# Patient Record
Sex: MALE | Race: WHITE | URBAN - METROPOLITAN AREA
[De-identification: names, ages, dates, MRNs, and addresses within clinical notes are randomized per-mention and may not be internally consistent; named-entity substitution may affect disease eponyms.]

---

## 2017-10-15 ENCOUNTER — HOSPITAL ENCOUNTER (EMERGENCY)
Facility: CLINIC | Age: 27
Discharge: HOME | End: 2017-10-16
Attending: EMERGENCY MEDICINE | Admitting: EMERGENCY MEDICINE
Payer: COMMERCIAL

## 2017-10-15 DIAGNOSIS — B34.9 VIRAL SYNDROME: ICD-10-CM

## 2017-10-15 DIAGNOSIS — J06.9 VIRAL UPPER RESPIRATORY TRACT INFECTION: ICD-10-CM

## 2017-10-15 PROCEDURE — 99283 EMERGENCY DEPT VISIT LOW MDM: CPT

## 2017-10-16 VITALS
RESPIRATION RATE: 18 BRPM | SYSTOLIC BLOOD PRESSURE: 159 MMHG | TEMPERATURE: 98.1 F | HEART RATE: 96 BPM | DIASTOLIC BLOOD PRESSURE: 98 MMHG | OXYGEN SATURATION: 96 %

## 2017-10-16 RX ORDER — SODIUM CHLORIDE 9 MG/ML
1000 INJECTION, SOLUTION INTRAVENOUS CONTINUOUS
Status: DISCONTINUED | OUTPATIENT
Start: 2017-10-16 | End: 2017-10-16

## 2017-10-16 ASSESSMENT — ENCOUNTER SYMPTOMS
RHINORRHEA: 1
CHILLS: 1
VOMITING: 1
DIAPHORESIS: 1
FEVER: 1
COUGH: 1
CHEST TIGHTNESS: 1

## 2017-10-16 NOTE — ED PROVIDER NOTES
History     Chief Complaint:  Cough    The history is provided by the patient.      Mariusz Griffiths is a 27 year old male who presents to the emergency department today for evaluation of cough. Yesterday, the patient experienced onset of cough which got progressively worse today. At home, he measured a fever of 100.0 F and was also experiencing chills, chest tightness from coughing, and nasal congestion with some green mucous. Endorses post-tussive emesis. He states that laying down makes the cough worse. He took ibuprofen approximately 1.5 hours prior to arrival. Upon arrival to the emergency department, he started having sweating diaphoresis. No sick contacts.    Allergies:  Drug allergies reviewed. No pertinent drug allergies.     Medications:    Medications reviewed. No pertinent medications.    Past Medical History:    History reviewed. No pertinent past medical history.    Past Surgical History:    Past surgical history reviewed. No pertinent past surgical history.     Family History:    Family history reviewed. No pertinent family history.     Social History:  Smoking Status: Never Smoker  Smokeless Tobacco: Never Used  Alcohol Use: Negative   Marital Status:      Review of Systems   Constitutional: Positive for chills, diaphoresis and fever.   HENT: Positive for congestion and rhinorrhea.    Respiratory: Positive for cough and chest tightness.    Gastrointestinal: Positive for vomiting (resolved).   All other systems reviewed and are negative.    Physical Exam     Patient Vitals for the past 24 hrs:   BP Temp Temp src Pulse Heart Rate Resp SpO2   10/16/17 0102 - - - 96 - - 96 %   10/16/17 0100 - 98.1  F (36.7  C) Oral 99 - - 96 %   10/16/17 0023 - - - 115 - - -   10/15/17 2357 (!) 159/98 99.3  F (37.4  C) Oral 123 123 18 98 %       Physical Exam  General: Well-developed and well-nourished; well appearing young  man; cooperative  Head:  Atraumatic  Eyes:  Extraocular movements intact; conjunctivae,  "lids, and sclerae are normal  ENT:    Normal nose; moist mucous membranes  Neck:  Supple; normal range of motion  CV:  Tachycardic rate and normal rhythm; normal heart sounds with no murmurs, rubs, or gallops detected  Resp:  No respiratory distress; clear to auscultation bilaterally without decreased breath sounds, wheezing, rales, or rhonchi  GI:  Soft; non-distended; non-tender    MS:  Normal ROM; no bilateral lower extremity edema  Skin:  Warm; diaphoretic; no pallor  Neuro: Awake; A&Ox3; normal strength  Psych:  Normal mood and affect; normal speech  Vitals reviewed.    Emergency Department Course     Emergency Department Course:    2338 Nursing notes and vitals reviewed.    2356 I performed an exam of the patient as documented above.     0025 I rechecked the patient and he states that he prefers not to undergo labs and imaging.    I discussed the treatment plan with the patient. He expressed understanding of this plan and consented to discharge. He will be discharged home with instructions for care and follow up. In addition, the patient will return to the emergency department if his symptoms persist, worsen, if new symptoms arise or if there is any concern.  All questions were answered prior to discharge.    Impression & Plan      Medical Decision Making:  Mariusz Griffiths is a 27 year old male who presents to the emergency department today for evaluation of green nasal discharge/congestion and nonproductive cough. He states he was febrile and took ibuprofen prior to arrival. On exam, patient is tachycardic and diaphoretic. His lungs are clear. He is very well- appearing otherwise.     I ordered IV fluids and basic labs as well as chest x-ray for this patient. However, he states that he is feeling much better after the fever broke and he would like to defer these tests at this time. Given I believe this is likely a viral syndrome/viral URI and the diaphoresis (now resolved) was due to his fever \"breaking,\" I " believe deferring work-up is appropriate at this time. However, I would like to monitor the patient until he has resolution of his tachycardia with PO fluid intake.    Patient took a significant amount of PO fluids while in the department. He tolerated well. He continued to appear very well. He had resolution of his tachycardia. He had a resolution of his diaphoresis. He would like to be discharged and follow-up with his primary care provider. I believe this is a reasonable course of action because, again, I believe this is likely a viral syndrome that will improve with supportive care. I provided return precautions and answered all of the patient's questions. He verbalized understanding and is amenable to discharge. Discussed hand hygiene, covering cough, etc. Will continue APAP or Motrin PRN fever.    Diagnosis:    ICD-10-CM    1. Viral upper respiratory tract infection J06.9     B97.89    2. Viral syndrome B34.9      Disposition:   The patient was discharged home.    Scribe Disclosure:  I, Love Campos, am serving as a scribe at 11:56 PM on 10/15/2017 to document services personally performed by Manuela Coyne MD, based on my observations and the provider's statements to me.      [facility] EMERGENCY DEPARTMENT       Manuela Coyne MD  10/16/17 0225

## 2017-10-16 NOTE — ED NOTES
Pt provided with discharge paperwork and educated on recommended follow-up with PCP. Pt educated on how to manage symptoms at home. Pt voiced understanding and denied any questions at discharge.

## 2017-10-16 NOTE — ED NOTES
"Patient diaphoretic with heart rate in 110-120s. Patient reports taking ibuprofen prior to arriving in emergency room. Discussed plan of care with patient. Patient reports fear of needles and that he is disinclined to get labs drawn. Patient educated on reason for obtaining labs and placing an IV for hydration. Patient encouraged to drink water for oral hydration. Discussed x-ray. Patient reports nervousness about receiving so many tests because he just changed jobs and is unsure of what insurance coverage he has. After conversation with patient, patient is no longer diaphoretic. Patient reports he feels \"so much better\" and believes his symptoms have resolved because his fever broke. MD notified. Patient to push PO fluids and to continue to monitor heart rate. MD in to talk with patient about plan of care.  "

## 2017-10-16 NOTE — DISCHARGE INSTRUCTIONS
Take Motrin or Tylenol as needed for fever.  Return with new or worsening symptoms.  Practice good hand hygiene and cover your mouth when you cough.  Follow up with primary care provider.

## 2017-10-17 ENCOUNTER — OFFICE VISIT (OUTPATIENT)
Dept: FAMILY MEDICINE | Facility: CLINIC | Age: 27
End: 2017-10-17
Payer: COMMERCIAL

## 2017-10-17 VITALS
OXYGEN SATURATION: 95 % | TEMPERATURE: 98.7 F | SYSTOLIC BLOOD PRESSURE: 136 MMHG | DIASTOLIC BLOOD PRESSURE: 90 MMHG | HEART RATE: 118 BPM | WEIGHT: 222.7 LBS | RESPIRATION RATE: 20 BRPM

## 2017-10-17 DIAGNOSIS — J00 ACUTE NASOPHARYNGITIS: Primary | ICD-10-CM

## 2017-10-17 DIAGNOSIS — R09.1 PLEURITIS: ICD-10-CM

## 2017-10-17 PROCEDURE — 99203 OFFICE O/P NEW LOW 30 MIN: CPT | Performed by: FAMILY MEDICINE

## 2017-10-17 RX ORDER — CODEINE PHOSPHATE AND GUAIFENESIN 10; 100 MG/5ML; MG/5ML
2 SOLUTION ORAL EVERY 4 HOURS PRN
Qty: 120 ML | Refills: 1 | Status: SHIPPED | OUTPATIENT
Start: 2017-10-17

## 2017-10-17 RX ORDER — IBUPROFEN 600 MG/1
600 TABLET, FILM COATED ORAL EVERY 6 HOURS PRN
Qty: 30 TABLET | Refills: 1 | Status: SHIPPED | OUTPATIENT
Start: 2017-10-17

## 2017-10-17 ASSESSMENT — ENCOUNTER SYMPTOMS
DIARRHEA: 0
CHILLS: 1
SPUTUM PRODUCTION: 1
CONSTIPATION: 0
NAUSEA: 0
COUGH: 1
VOMITING: 0
CARDIOVASCULAR NEGATIVE: 1
ABDOMINAL PAIN: 0
FEVER: 0
SORE THROAT: 1
HEADACHES: 1

## 2017-10-17 NOTE — MR AVS SNAPSHOT
"              After Visit Summary   10/17/2017    Mariusz Griffiths    MRN: [id number]           Patient Information     Date Of Birth          1990        Visit Information        Provider Department      10/17/2017 10:40 AM Andrzej Rojas MD [facility]        Today's Diagnoses     Acute nasopharyngitis    -  1    Pleuritis           Follow-ups after your visit        Follow-up notes from your care team     Return in about 1 week (around 10/24/2017).      Who to contact     If you have questions or need follow up information about today's clinic visit or your schedule please contact [facility] directly at [phone].  Normal or non-critical lab and imaging results will be communicated to you by MyChart, letter or phone within 4 business days after the clinic has received the results. If you do not hear from us within 7 days, please contact the clinic through MyChart or phone. If you have a critical or abnormal lab result, we will notify you by phone as soon as possible.  Submit refill requests through [employer] or call your pharmacy and they will forward the refill request to us. Please allow 3 business days for your refill to be completed.          Additional Information About Your Visit        MyChart Information     [employer] lets you send messages to your doctor, view your test results, renew your prescriptions, schedule appointments and more. To sign up, go to [email or web address] . Click on \"Log in\" on the left side of the screen, which will take you to the Welcome page. Then click on \"Sign up Now\" on the right side of the page.     You will be asked to enter the access code listed below, as well as some personal information. Please follow the directions to create your username and password.     Your access code is: [email or web address]  Expires: 2018  1:26 AM     Your access code will  in 90 days. If you need help or a new code, please call your [facility] " or [phone].        Care EveryWhere ID     This is your Care EveryWhere ID. This could be used by other organizations to access your [place] medical records  [id number]        Your Vitals Were     Pulse Temperature Respirations Pulse Oximetry          118 98.7  F (37.1  C) (Oral) 20 95%         Blood Pressure from Last 3 Encounters:   10/17/17 136/90   10/15/17 (!) 159/98    Weight from Last 3 Encounters:   10/17/17 222 lb 11.2 oz (101 kg)              Today, you had the following     No orders found for display         Today's Medication Changes          These changes are accurate as of: 10/17/17 11:16 AM.  If you have any questions, ask your nurse or doctor.               Start taking these medicines.        Dose/Directions    guaiFENesin-codeine 100-10 MG/5ML Soln solution   Commonly known as:  ROBITUSSIN AC   Used for:  Acute nasopharyngitis   Started by:  Andrzej Rojas MD        Dose:  2 tsp.   Take 10 mLs by mouth every 4 hours as needed for cough   Quantity:  120 mL   Refills:  1       ibuprofen 600 MG tablet   Commonly known as:  ADVIL/MOTRIN   Used for:  Pleuritis   Started by:  Andrzej Rojas MD        Dose:  600 mg   Take 1 tablet (600 mg) by mouth every 6 hours as needed for moderate pain   Quantity:  30 tablet   Refills:  1            Where to get your medicines      These medications were sent to [place], [place] - [address] [place], [place] [address]     Phone:  [phone]     ibuprofen 600 MG tablet         Some of these will need a paper prescription and others can be bought over the counter.  Ask your nurse if you have questions.     Bring a paper prescription for each of these medications     guaiFENesin-codeine 100-10 MG/5ML Soln solution                Primary Care Provider Office Phone # Fax #    Andrzej Rojas [phone]       [id number]  [facility] [address]        Equal Access to  Services     [facility]: Hadii aad ku hadcjshilpa Laurynjose david, waaxda luqadaha, qaybta kaalmada chaim, mario blankenship. So [facility] [phone].    ATENCIÓN: Si habla español, tiene a mcqueen disposición servicios gratuitos de asistencia lingüística. Llame al [phone].    We comply with applicable federal civil rights laws and [place] laws. We do not discriminate on the basis of race, color, national origin, age, disability, sex, sexual orientation, or gender identity.            Thank you!     Thank you for choosing [facility]  for your care. Our goal is always to provide you with excellent care. Hearing back from our patients is one way we can continue to improve our services. Please take a few minutes to complete the written survey that you may receive in the mail after your visit with us. Thank you!             Your Updated Medication List - Protect others around you: Learn how to safely use, store and throw away your medicines at www.disposemymeds.org.          This list is accurate as of: 10/17/17 11:16 AM.  Always use your most recent med list.                   Brand Name Dispense Instructions for use Diagnosis    guaiFENesin-codeine 100-10 MG/5ML Soln solution    ROBITUSSIN AC    120 mL    Take 10 mLs by mouth every 4 hours as needed for cough    Acute nasopharyngitis       ibuprofen 600 MG tablet    ADVIL/MOTRIN    30 tablet    Take 1 tablet (600 mg) by mouth every 6 hours as needed for moderate pain    Pleuritis       MULTIVITAMIN ADULT PO      1 tablet by Oral or Feeding Tube route daily

## 2017-10-17 NOTE — PROGRESS NOTES
HPI    SUBJECTIVE:   Mariusz Griffiths is a 27 year old male who presents to clinic today for the following health issues:    Acute Illness   Acute illness concerns: URI  Onset: x4 days    Fever: YES    Chills/Sweats: YES    Headache (location?): YES- frontal/temperoal    Sinus Pressure:no    Conjunctivitis:  no    Ear Pain: no    Rhinorrhea: YES    Congestion: YES    Sore Throat: no     Cough: YES-non-productive but has coughed up green phlem    Wheeze: YES    Decreased Appetite: no    Nausea: no    Vomiting: no    Diarrhea:  no    Dysuria/Freq.: no    Fatigue/Achiness: YES    Sick/Strep Exposure: no     Therapies Tried and outcome: Dayquil/Nyquil, Emergen-C    Was seen in ED two days ago.  Congestion, productive cough, malaise.  Now have chest wall pain from coughing.  Has never had anything this severe.  No shortness of breath or wheezing.    No regular meds.      Review of Systems   Constitutional: Positive for chills and malaise/fatigue. Negative for fever.   HENT: Positive for congestion and sore throat.    Respiratory: Positive for cough and sputum production.    Cardiovascular: Negative.    Gastrointestinal: Negative for abdominal pain, constipation, diarrhea, nausea and vomiting.   Skin: Negative for rash.   Neurological: Positive for headaches.       Physical Exam   Constitutional: He is well-developed, well-nourished, and in no distress. No distress.   HENT:   Right Ear: Tympanic membrane, external ear and ear canal normal.   Left Ear: Tympanic membrane, external ear and ear canal normal.   Mouth/Throat: Oropharynx is clear and moist. No oropharyngeal exudate.   Eyes: Conjunctivae are normal.   Neck: Neck supple.   Cardiovascular: Normal rate, regular rhythm and normal heart sounds.    Pulmonary/Chest: Effort normal and breath sounds normal.   Lymphadenopathy:     He has no cervical adenopathy.   Skin: Skin is warm and dry. No rash noted.   Nursing note and vitals reviewed.    (J00) Acute nasopharyngitis   (primary encounter diagnosis)  Comment: advised that based on timing he may actually feel worse for the next few days before he'll start to feel better.  May call for abx if still feeling poorly in one  Plan: guaiFENesin-codeine (ROBITUSSIN AC) 100-10         MG/5ML SOLN solution            (R09.1) Pleuritis  Comment: q8h  Plan: ibuprofen (ADVIL/MOTRIN) 600 MG tablet              RTC in 10d    Andrzej Rojas MD

## 2017-10-17 NOTE — NURSING NOTE
Chief Complaint   Patient presents with     URI       Initial /90  Pulse 118  Temp 98.7  F (37.1  C) (Oral)  Resp 20  Wt 222 lb 11.2 oz (101 kg)  SpO2 95% There is no height or weight on file to calculate BMI.  Medication Reconciliation: complete   Judie Cabrera CMA ([facility])

## 2019-01-21 ENCOUNTER — OFFICE VISIT (OUTPATIENT)
Dept: FAMILY MEDICINE | Facility: CLINIC | Age: 29
End: 2019-01-21
Payer: COMMERCIAL

## 2019-01-21 ENCOUNTER — ANCILLARY PROCEDURE (OUTPATIENT)
Dept: GENERAL RADIOLOGY | Facility: CLINIC | Age: 29
End: 2019-01-21
Payer: COMMERCIAL

## 2019-01-21 VITALS
TEMPERATURE: 98.3 F | RESPIRATION RATE: 22 BRPM | BODY MASS INDEX: 30.77 KG/M2 | WEIGHT: 214.9 LBS | HEART RATE: 110 BPM | OXYGEN SATURATION: 95 % | DIASTOLIC BLOOD PRESSURE: 94 MMHG | SYSTOLIC BLOOD PRESSURE: 124 MMHG | HEIGHT: 70 IN

## 2019-01-21 DIAGNOSIS — R50.9 FEVER, UNSPECIFIED FEVER CAUSE: Primary | ICD-10-CM

## 2019-01-21 DIAGNOSIS — R50.9 FEVER, UNSPECIFIED FEVER CAUSE: ICD-10-CM

## 2019-01-21 DIAGNOSIS — B08.1 MOLLUSCUM CONTAGIOSUM: ICD-10-CM

## 2019-01-21 LAB
FLUAV+FLUBV AG SPEC QL: NEGATIVE
FLUAV+FLUBV AG SPEC QL: NEGATIVE
SPECIMEN SOURCE: NORMAL

## 2019-01-21 PROCEDURE — 87804 INFLUENZA ASSAY W/OPTIC: CPT | Performed by: FAMILY MEDICINE

## 2019-01-21 PROCEDURE — 99214 OFFICE O/P EST MOD 30 MIN: CPT | Performed by: FAMILY MEDICINE

## 2019-01-21 PROCEDURE — 71046 X-RAY EXAM CHEST 2 VIEWS: CPT | Mod: FY

## 2019-01-21 RX ORDER — IMIQUIMOD 12.5 MG/.25G
CREAM TOPICAL
Qty: 24 PACKET | Refills: 1 | Status: SHIPPED | OUTPATIENT
Start: 2019-01-21 | End: 2020-01-21

## 2019-01-21 ASSESSMENT — ENCOUNTER SYMPTOMS
SHORTNESS OF BREATH: 1
FEVER: 0
COUGH: 1
SPUTUM PRODUCTION: 1
CHILLS: 1
HEMOPTYSIS: 1
SORE THROAT: 1
WHEEZING: 0
HEADACHES: 1
VOMITING: 0
DIARRHEA: 0
ABDOMINAL PAIN: 0
CONSTIPATION: 0
NAUSEA: 0
CARDIOVASCULAR NEGATIVE: 1

## 2019-01-21 ASSESSMENT — MIFFLIN-ST. JEOR: SCORE: 1955

## 2019-01-21 NOTE — PROGRESS NOTES
HPI    SUBJECTIVE:   Mariusz Griffiths is a 28 year old male who presents to clinic today for the following health issues:    Rash  Onset: x1 week    Description:   Location: groin  Character: round, raised, red  Itching (Pruritis): no     Progression of Symptoms:  improving    Accompanying Signs & Symptoms:  Fever: YES- 102  last night  Body aches or joint pain: YES  Sore throat symptoms: YES  Recent cold symptoms: YES    History:   Previous similar rash: YES    Precipitating factors:   Exposure to similar rash: no   New exposures: None   Recent travel: no     Alleviating factors:  None    Therapies Tried and outcome: hydrocortisone, peroxide, soap & water    Has been present for a little over one week.  Non-tender.  5 bumps, all in one cluster.  No previous known genital herpes, neither he nor wife has known oral herpes.  Did have molluscum as child.  Wife is only sexual partner.      Acute Illness   Acute illness concerns: URI  Onset: x3 days    Fever: YES- 102  last night    Chills/Sweats: YES    Headache (location?): no    Sinus Pressure:YES    Conjunctivitis:  no    Ear Pain: no    Rhinorrhea: YES    Congestion: YES    Sore Throat: YES     Cough: YES-productive of green sputum    Wheeze: YES    Decreased Appetite: YES    Nausea: No    Vomiting: YES, coughed so hard he threw up    Diarrhea:  no    Dysuria/Freq.: YES- increase in fluids    Fatigue/Achiness: YES    Sick/Strep Exposure: YES- daughter has been sick     Therapies Tried and outcome: ibuprofen, increase in fluids    Sx seemed to come on quickly, woke with fever.  Marked myalgia.  Daughter recently diagnosed with PNA.  No flu shot this year.  Lots of rhinorrhea.  Appetite is not great.      Review of Systems   Constitutional: Positive for chills and malaise/fatigue. Negative for fever.   HENT: Positive for congestion and sore throat.    Respiratory: Positive for cough, hemoptysis, sputum production and shortness of breath. Negative for wheezing.     Cardiovascular: Negative.  Negative for chest pain.   Gastrointestinal: Negative for abdominal pain, constipation, diarrhea, nausea and vomiting.   Skin: Positive for rash.   Neurological: Positive for headaches.         Physical Exam   Constitutional: No distress.   HENT:   Right Ear: Tympanic membrane, external ear and ear canal normal.   Left Ear: Tympanic membrane, external ear and ear canal normal.   Mouth/Throat: Oropharynx is clear and moist. No oropharyngeal exudate.   Eyes: Conjunctivae are normal.   Neck: Neck supple.   Cardiovascular: Normal rate, regular rhythm and normal heart sounds.   Pulmonary/Chest: Effort normal and breath sounds normal.   Lymphadenopathy:     He has no cervical adenopathy.   Skin: Skin is warm and dry. No rash noted.   Small cluster of molluscum over central pubic bone   Nursing note and vitals reviewed.      (R50.9) Fever, unspecified fever cause  (primary encounter diagnosis)  Comment: neg flu, nl CXR.  Sx generally sound upper respiratory, fever is unusual and vitals are just abit off.  Lower threshold for return  Plan: Influenza A/B antigen, XR Chest 2 Views            (B08.1) Molluscum contagiosum  Comment:   Plan: imiquimod (ALDARA) 5 % external cream              RTC in 1w    Andrzej Rojas MD